# Patient Record
Sex: FEMALE | NOT HISPANIC OR LATINO
[De-identification: names, ages, dates, MRNs, and addresses within clinical notes are randomized per-mention and may not be internally consistent; named-entity substitution may affect disease eponyms.]

---

## 2017-08-02 ENCOUNTER — RX RENEWAL (OUTPATIENT)
Age: 57
End: 2017-08-02

## 2017-08-11 ENCOUNTER — LABORATORY RESULT (OUTPATIENT)
Age: 57
End: 2017-08-11

## 2017-09-19 ENCOUNTER — APPOINTMENT (OUTPATIENT)
Dept: NEPHROLOGY | Facility: CLINIC | Age: 57
End: 2017-09-19
Payer: MEDICAID

## 2017-09-19 VITALS
OXYGEN SATURATION: 98 % | HEART RATE: 75 BPM | SYSTOLIC BLOOD PRESSURE: 110 MMHG | BODY MASS INDEX: 23.51 KG/M2 | WEIGHT: 141.09 LBS | HEIGHT: 65 IN | DIASTOLIC BLOOD PRESSURE: 69 MMHG

## 2017-09-19 VITALS — SYSTOLIC BLOOD PRESSURE: 106 MMHG | DIASTOLIC BLOOD PRESSURE: 66 MMHG

## 2017-09-19 PROCEDURE — 99214 OFFICE O/P EST MOD 30 MIN: CPT

## 2017-09-20 ENCOUNTER — RX RENEWAL (OUTPATIENT)
Age: 57
End: 2017-09-20

## 2018-04-04 ENCOUNTER — EMERGENCY (EMERGENCY)
Facility: HOSPITAL | Age: 58
LOS: 1 days | Discharge: HOME | End: 2018-04-04
Attending: EMERGENCY MEDICINE
Payer: COMMERCIAL

## 2018-04-04 VITALS
DIASTOLIC BLOOD PRESSURE: 80 MMHG | RESPIRATION RATE: 16 BRPM | SYSTOLIC BLOOD PRESSURE: 125 MMHG | HEART RATE: 80 BPM | TEMPERATURE: 98 F | OXYGEN SATURATION: 97 %

## 2018-04-04 VITALS
DIASTOLIC BLOOD PRESSURE: 82 MMHG | SYSTOLIC BLOOD PRESSURE: 123 MMHG | RESPIRATION RATE: 18 BRPM | HEART RATE: 86 BPM | OXYGEN SATURATION: 98 % | TEMPERATURE: 98 F | WEIGHT: 139.99 LBS

## 2018-04-04 DIAGNOSIS — R09.89 OTHER SPECIFIED SYMPTOMS AND SIGNS INVOLVING THE CIRCULATORY AND RESPIRATORY SYSTEMS: ICD-10-CM

## 2018-04-04 PROCEDURE — 99284 EMERGENCY DEPT VISIT MOD MDM: CPT | Mod: 25

## 2018-04-04 PROCEDURE — 70490 CT SOFT TISSUE NECK W/O DYE: CPT

## 2018-04-04 PROCEDURE — 99285 EMERGENCY DEPT VISIT HI MDM: CPT | Mod: 25

## 2018-04-04 PROCEDURE — 70490 CT SOFT TISSUE NECK W/O DYE: CPT | Mod: 26

## 2018-04-04 PROCEDURE — 31575 DIAGNOSTIC LARYNGOSCOPY: CPT

## 2018-04-04 PROCEDURE — 99284 EMERGENCY DEPT VISIT MOD MDM: CPT

## 2018-04-04 NOTE — ED PROVIDER NOTE - PHYSICAL EXAMINATION
A&Ox3 no apparent distress. Oral and pharyngeal mucosa WNL. CN 2-12 grossly intact without focal neurologic defict. PERRLA, EOMI. Heart RRR no murmur. No JVD. No peripheral edema. Lungs CTA b/l no wheezes, rhonchi, rales. Abdomen soft nontender nondistended. Peripheral pulses present and equal b/l. Head NCAT. Skin normal for race.

## 2018-04-04 NOTE — CONSULT NOTE ADULT - PROBLEM SELECTOR RECOMMENDATION 9
Anti reflux meds  F/U with ENT if pt doesn't feel better  F/U official neck CT report Anti reflux meds  reflux precautions  can consider medrol dose pack for VF ecchymosis and dysphonia   F/U with ENT if pt doesn't feel better  F/U official neck CT report

## 2018-04-04 NOTE — ED PROVIDER NOTE - OBJECTIVE STATEMENT
57 yoF no significant medical history presents with foreign body sensation in throat after choking on shrimp earlier today. Pt endorses dysphagia with solids but not liquids. Pt denies fever, chills, N/V/D, abdominal pain, fall/trauma, HA, dizziness, LOC, SOB, dyspnea, CP.

## 2018-04-04 NOTE — CONSULT NOTE ADULT - ASSESSMENT
56yo Female with foreign body sensation since lunch after swallowing a baby shrimp. Indirect laryngoscopy revealed a left BOT hematoma, but no foreign body. 56yo Female with foreign body sensation since lunch after swallowing a baby shrimp. Indirect laryngoscopy revealed a left arytenoid hematoma, but no foreign body. no airway obstruction or impingement

## 2018-04-04 NOTE — ED PROVIDER NOTE - MEDICAL DECISION MAKING DETAILS
ATTG: left throat pain concern for FB, check CT, pain medication offered and refused,. may need ENT if FB present.

## 2018-04-04 NOTE — ED PROVIDER NOTE - PROGRESS NOTE DETAILS
ATTG: Dr Rey - ENT came to bedside and scoped patient and no FB noted. awaiting Ct results. ATTG: ct reading with no radioopaque FB noted. dc home to follow with pmd as outpt.

## 2018-04-04 NOTE — ED PROVIDER NOTE - ATTENDING CONTRIBUTION TO CARE
56 y/o f with pmhx HTN presents for pain in left side of throat after swallowing a shrimp approx 1 hr ago now feels like it is stuck in her throat.  no bleeding no shortness of breath. no pain in chest. first time. no vomiting. no allergy. son with her at bedside.   Gen.  no acute distress  HEENT:  EOMI PERRL, pharynx clear no blood. no exudate. patent airway, midline uvula. no edema no FB noted.   Lungs:  ctab/l no wheezing or retraction  CVS: S1S2    Abd;  soft non tend  Ext: no edema  Neuro: aaox3 no focal deficits.   MSK: 5/5 x 4 ext

## 2018-04-04 NOTE — CONSULT NOTE ADULT - SUBJECTIVE AND OBJECTIVE BOX
POD/STATUS POST/ENT ISSUE: Foreign body sensation    INTERVAL HPI: 58yo female with pmhx HTN presents to the ER C/O pain in the left side of throat since around 12 noon. Pt states it started while eating shrimp for lunch and fell that a baby shrimp is suck in her throat. Pt admits to drinking water and vinegar and fell a little better, but still has pain. Pt tried to cough it out with no success. Pt also cough up some blood tinged secretions in the ER. Pt speaks in full sentences. Pt admits to odynophagia, but denies SOB, change in voice, N/V, previous H/O foods getting stuck in her throat.      Vital Signs Last 24 Hrs  T(C): 36.7 (04 Apr 2018 14:05), Max: 36.7 (04 Apr 2018 14:05)  T(F): 98 (04 Apr 2018 14:05), Max: 98 (04 Apr 2018 14:05)  HR: 86 (04 Apr 2018 14:05) (86 - 86)  BP: 123/82 (04 Apr 2018 14:05) (123/82 - 123/82)  BP(mean): --  RR: 18 (04 Apr 2018 14:05) (18 - 18)  SpO2: 98% (04 Apr 2018 14:05) (98% - 98%)    PHYSICAL EXAM:  Gen: NAD, well-developed  Head: Normocephalic, Atraumatic  Eyes: PERRL, EOMI, no scleral injection  Nose: Nares bilaterally patent, no discharge  Mouth: no drooling, no FB noted, Mucosa moist, tongue/uvula midline, oropharynx clear  Neck: Flat, supple, no lymphadenopathy, trachea midline, no masses  Resp: no stridor  CV: no peripheral edema/cyanosis    FOE/Laryngoscopy- Hematoma noted in left BOT, ecchymosis of left true VC, no active bleeding, no FB noted, no pooling of secretions, airway patent, no edema of B/L vocal folds and post cricoid region. POD/STATUS POST/ENT ISSUE: Foreign body sensation    INTERVAL HPI: 56yo female with pmhx HTN presents to the ER C/O pain in the left side of throat since around 12 noon. Pt states it started while eating shrimp for lunch and fell that a baby shrimp is suck in her throat. Pt admits to drinking water and vinegar and fell a little better, but still has pain. Pt fell the FB sensation a little lower after that. Pt tried to cough it out with no success. Pt also cough up some blood tinged secretions in the ER. Pt speaks in full sentences. Pt admits to odynophagia, but denies SOB, change in voice, N/V, previous H/O foods getting stuck in her throat.      Vital Signs Last 24 Hrs  T(C): 36.7 (04 Apr 2018 14:05), Max: 36.7 (04 Apr 2018 14:05)  T(F): 98 (04 Apr 2018 14:05), Max: 98 (04 Apr 2018 14:05)  HR: 86 (04 Apr 2018 14:05) (86 - 86)  BP: 123/82 (04 Apr 2018 14:05) (123/82 - 123/82)  BP(mean): --  RR: 18 (04 Apr 2018 14:05) (18 - 18)  SpO2: 98% (04 Apr 2018 14:05) (98% - 98%)    PHYSICAL EXAM:  Gen: NAD, well-developed  Head: Normocephalic, Atraumatic  Eyes: PERRL, EOMI, no scleral injection  Nose: Nares bilaterally patent, no discharge  Mouth: no drooling, no FB noted, Mucosa moist, tongue/uvula midline, oropharynx clear  Neck: Flat, supple, no lymphadenopathy, trachea midline, no masses  Resp: no stridor  CV: no peripheral edema/cyanosis    FOE/Laryngoscopy- Hematoma noted in left BOT, ecchymosis of left true VC, no active bleeding, no FB noted, no pooling of secretions, airway patent, no edema of B/L vocal folds and post cricoid region. POD/STATUS POST/ENT ISSUE: Foreign body sensation    INTERVAL HPI: 58yo female with pmhx HTN presents to the ER C/O pain in the left side of throat since around 12 noon. Pt states it started while eating shrimp for lunch and fell that a baby shrimp is suck in her throat. Pt admits to drinking water and vinegar and fell a little better, but still has pain. Pt fell the FB sensation a little lower after that. Pt tried to cough it out with no success. Pt also cough up some blood tinged secretions in the ER. Pt speaks in full sentences. Pt admits to odynophagia, but denies SOB, change in voice, N/V, previous H/O foods getting stuck in her throat.      Vital Signs Last 24 Hrs  T(C): 36.7 (04 Apr 2018 14:05), Max: 36.7 (04 Apr 2018 14:05)  T(F): 98 (04 Apr 2018 14:05), Max: 98 (04 Apr 2018 14:05)  HR: 86 (04 Apr 2018 14:05) (86 - 86)  BP: 123/82 (04 Apr 2018 14:05) (123/82 - 123/82)  BP(mean): --  RR: 18 (04 Apr 2018 14:05) (18 - 18)  SpO2: 98% (04 Apr 2018 14:05) (98% - 98%)    PHYSICAL EXAM:  Gen: NAD, well-developed  Head: Normocephalic, Atraumatic  Eyes: PERRL, EOMI, no scleral injection  Nose: Nares bilaterally patent, no discharge  Mouth: no drooling, no FB noted, Mucosa moist, tongue/uvula midline, oropharynx clear  Neck: Flat, supple, no lymphadenopathy, trachea midline, no masses  Resp: no stridor  CV: no peripheral edema/cyanosis    FOE/Laryngoscopy- Hematoma noted in left posterio surface of the arytenoid, ecchymosis of left true VC, no active bleeding, no FB noted, no pooling of secretions, airway patent, no edema of B/L vocal folds and post cricoid region. mild fullness right BOT - no evidence acute trauma there POD/STATUS POST/ENT ISSUE: Foreign body sensation    INTERVAL HPI: 56yo female with pmhx HTN presents to the ER C/O pain in the left side of throat since around 12 noon. Pt states it started while eating shrimp for lunch and fell that a baby shrimp is suck in her throat. Pt admits to drinking water and vinegar and fell a little better, but still has pain. Pt fell the FB sensation a little lower after that. Pt tried to cough it out with no success. Pt also cough up some blood tinged secretions in the ER. Pt speaks in full sentences. Pt admits to odynophagia, but denies SOB, change in voice, N/V, previous H/O foods getting stuck in her throat.      Vital Signs Last 24 Hrs  T(C): 36.7 (04 Apr 2018 14:05), Max: 36.7 (04 Apr 2018 14:05)  T(F): 98 (04 Apr 2018 14:05), Max: 98 (04 Apr 2018 14:05)  HR: 86 (04 Apr 2018 14:05) (86 - 86)  BP: 123/82 (04 Apr 2018 14:05) (123/82 - 123/82)  BP(mean): --  RR: 18 (04 Apr 2018 14:05) (18 - 18)  SpO2: 98% (04 Apr 2018 14:05) (98% - 98%)    PHYSICAL EXAM:  Gen: NAD, well-developed  Head: Normocephalic, Atraumatic  Eyes: PERRL, EOMI, no scleral injection  Nose: Nares bilaterally patent, no discharge  Mouth: no drooling, no FB noted, Mucosa moist, tongue/uvula midline, oropharynx clear  Neck: Flat, supple, no lymphadenopathy, trachea midline, no masses  Resp: no stridor  CV: no peripheral edema/cyanosis    FOE/Laryngoscopy- Hematoma noted in left posterio surface of the arytenoid, ecchymosis of left true VC, no active bleeding, no FB noted, no pooling of secretions, airway patent, no edema of B/L vocal folds and post cricoid region. mild fullness right BOT - no evidence acute trauma there       Ct reviewed

## 2020-02-17 NOTE — ED ADULT NURSE NOTE - BREATHING, MLM
Detail Level: Simple Spontaneous, unlabored and symmetrical Scheduled For Follow Up In (Optional): 13 weeks

## 2020-03-10 ENCOUNTER — APPOINTMENT (OUTPATIENT)
Dept: NEPHROLOGY | Facility: CLINIC | Age: 60
End: 2020-03-10

## 2024-07-03 ENCOUNTER — EMERGENCY (EMERGENCY)
Facility: HOSPITAL | Age: 64
LOS: 1 days | Discharge: HOME | End: 2024-07-03
Attending: EMERGENCY MEDICINE
Payer: COMMERCIAL

## 2024-07-03 VITALS
TEMPERATURE: 98 F | OXYGEN SATURATION: 96 % | HEART RATE: 66 BPM | RESPIRATION RATE: 18 BRPM | SYSTOLIC BLOOD PRESSURE: 124 MMHG | DIASTOLIC BLOOD PRESSURE: 74 MMHG

## 2024-07-03 VITALS
HEART RATE: 72 BPM | TEMPERATURE: 98 F | SYSTOLIC BLOOD PRESSURE: 121 MMHG | DIASTOLIC BLOOD PRESSURE: 79 MMHG | RESPIRATION RATE: 16 BRPM | OXYGEN SATURATION: 97 %

## 2024-07-03 LAB
ALBUMIN SERPL ELPH-MCNC: 3.9 G/DL — SIGNIFICANT CHANGE UP (ref 3.3–5)
ALP SERPL-CCNC: 56 U/L — SIGNIFICANT CHANGE UP (ref 40–120)
ALT FLD-CCNC: 11 U/L — SIGNIFICANT CHANGE UP (ref 10–45)
ANION GAP SERPL CALC-SCNC: 13 MMOL/L — SIGNIFICANT CHANGE UP (ref 5–17)
ANION GAP SERPL CALC-SCNC: 14 MMOL/L — SIGNIFICANT CHANGE UP (ref 5–17)
AST SERPL-CCNC: 15 U/L — SIGNIFICANT CHANGE UP (ref 10–40)
BASOPHILS # BLD AUTO: 0.01 K/UL — SIGNIFICANT CHANGE UP (ref 0–0.2)
BASOPHILS NFR BLD AUTO: 0.2 % — SIGNIFICANT CHANGE UP (ref 0–2)
BILIRUB SERPL-MCNC: 0.4 MG/DL — SIGNIFICANT CHANGE UP (ref 0.2–1.2)
BUN SERPL-MCNC: 32 MG/DL — HIGH (ref 7–23)
BUN SERPL-MCNC: 32 MG/DL — HIGH (ref 7–23)
CALCIUM SERPL-MCNC: 9.2 MG/DL — SIGNIFICANT CHANGE UP (ref 8.4–10.5)
CALCIUM SERPL-MCNC: 9.5 MG/DL — SIGNIFICANT CHANGE UP (ref 8.4–10.5)
CHLORIDE SERPL-SCNC: 104 MMOL/L — SIGNIFICANT CHANGE UP (ref 96–108)
CHLORIDE SERPL-SCNC: 106 MMOL/L — SIGNIFICANT CHANGE UP (ref 96–108)
CO2 SERPL-SCNC: 22 MMOL/L — SIGNIFICANT CHANGE UP (ref 22–31)
CO2 SERPL-SCNC: 25 MMOL/L — SIGNIFICANT CHANGE UP (ref 22–31)
CREAT SERPL-MCNC: 1.43 MG/DL — HIGH (ref 0.5–1.3)
CREAT SERPL-MCNC: 1.53 MG/DL — HIGH (ref 0.5–1.3)
EGFR: 38 ML/MIN/1.73M2 — LOW
EGFR: 38 ML/MIN/1.73M2 — LOW
EGFR: 41 ML/MIN/1.73M2 — LOW
EGFR: 41 ML/MIN/1.73M2 — LOW
EOSINOPHIL # BLD AUTO: 0.05 K/UL — SIGNIFICANT CHANGE UP (ref 0–0.5)
EOSINOPHIL NFR BLD AUTO: 0.9 % — SIGNIFICANT CHANGE UP (ref 0–6)
GLUCOSE SERPL-MCNC: 102 MG/DL — HIGH (ref 70–99)
GLUCOSE SERPL-MCNC: 112 MG/DL — HIGH (ref 70–99)
HCT VFR BLD CALC: 39.8 % — SIGNIFICANT CHANGE UP (ref 34.5–45)
HGB BLD-MCNC: 13.1 G/DL — SIGNIFICANT CHANGE UP (ref 11.5–15.5)
IMM GRANULOCYTES NFR BLD AUTO: 0.2 % — SIGNIFICANT CHANGE UP (ref 0–0.9)
LYMPHOCYTES # BLD AUTO: 1.43 K/UL — SIGNIFICANT CHANGE UP (ref 1–3.3)
LYMPHOCYTES # BLD AUTO: 25.7 % — SIGNIFICANT CHANGE UP (ref 13–44)
MAGNESIUM SERPL-MCNC: 2.2 MG/DL — SIGNIFICANT CHANGE UP (ref 1.6–2.6)
MCHC RBC-ENTMCNC: 30.6 PG — SIGNIFICANT CHANGE UP (ref 27–34)
MCHC RBC-ENTMCNC: 32.9 GM/DL — SIGNIFICANT CHANGE UP (ref 32–36)
MCV RBC AUTO: 93 FL — SIGNIFICANT CHANGE UP (ref 80–100)
MONOCYTES # BLD AUTO: 0.39 K/UL — SIGNIFICANT CHANGE UP (ref 0–0.9)
MONOCYTES NFR BLD AUTO: 7 % — SIGNIFICANT CHANGE UP (ref 2–14)
NEUTROPHILS # BLD AUTO: 3.67 K/UL — SIGNIFICANT CHANGE UP (ref 1.8–7.4)
NEUTROPHILS NFR BLD AUTO: 66 % — SIGNIFICANT CHANGE UP (ref 43–77)
NRBC # BLD: 0 /100 WBCS — SIGNIFICANT CHANGE UP (ref 0–0)
NRBC BLD-RTO: 0 /100 WBCS — SIGNIFICANT CHANGE UP (ref 0–0)
PHOSPHATE SERPL-MCNC: 4.2 MG/DL — SIGNIFICANT CHANGE UP (ref 2.5–4.5)
PLATELET # BLD AUTO: 184 K/UL — SIGNIFICANT CHANGE UP (ref 150–400)
POTASSIUM SERPL-MCNC: 4.2 MMOL/L — SIGNIFICANT CHANGE UP (ref 3.5–5.3)
POTASSIUM SERPL-MCNC: 4.4 MMOL/L — SIGNIFICANT CHANGE UP (ref 3.5–5.3)
POTASSIUM SERPL-SCNC: 4.2 MMOL/L — SIGNIFICANT CHANGE UP (ref 3.5–5.3)
POTASSIUM SERPL-SCNC: 4.4 MMOL/L — SIGNIFICANT CHANGE UP (ref 3.5–5.3)
PROT SERPL-MCNC: 7.1 G/DL — SIGNIFICANT CHANGE UP (ref 6–8.3)
RBC # BLD: 4.28 M/UL — SIGNIFICANT CHANGE UP (ref 3.8–5.2)
RBC # FLD: 13.6 % — SIGNIFICANT CHANGE UP (ref 10.3–14.5)
SODIUM SERPL-SCNC: 142 MMOL/L — SIGNIFICANT CHANGE UP (ref 135–145)
SODIUM SERPL-SCNC: 142 MMOL/L — SIGNIFICANT CHANGE UP (ref 135–145)
WBC # BLD: 5.56 K/UL — SIGNIFICANT CHANGE UP (ref 3.8–10.5)
WBC # FLD AUTO: 5.56 K/UL — SIGNIFICANT CHANGE UP (ref 3.8–10.5)

## 2024-07-03 PROCEDURE — 80048 BASIC METABOLIC PNL TOTAL CA: CPT

## 2024-07-03 PROCEDURE — 99284 EMERGENCY DEPT VISIT MOD MDM: CPT

## 2024-07-03 PROCEDURE — 96360 HYDRATION IV INFUSION INIT: CPT

## 2024-07-03 PROCEDURE — 80053 COMPREHEN METABOLIC PANEL: CPT

## 2024-07-03 PROCEDURE — 85025 COMPLETE CBC W/AUTO DIFF WBC: CPT

## 2024-07-03 PROCEDURE — 84100 ASSAY OF PHOSPHORUS: CPT

## 2024-07-03 PROCEDURE — 99284 EMERGENCY DEPT VISIT MOD MDM: CPT | Mod: 25

## 2024-07-03 PROCEDURE — 83735 ASSAY OF MAGNESIUM: CPT

## 2024-07-03 PROCEDURE — 36415 COLL VENOUS BLD VENIPUNCTURE: CPT

## 2024-07-03 RX ORDER — SODIUM CHLORIDE 9 G/1000ML
1000 INJECTION, SOLUTION INTRAVENOUS ONCE
Refills: 0 | Status: COMPLETED | OUTPATIENT
Start: 2024-07-03 | End: 2024-07-03

## 2024-07-03 RX ADMIN — SODIUM CHLORIDE 1000 MILLILITER(S): 9 INJECTION, SOLUTION INTRAVENOUS at 20:50

## 2024-07-03 RX ADMIN — SODIUM CHLORIDE 1000 MILLILITER(S): 9 INJECTION, SOLUTION INTRAVENOUS at 21:51

## 2024-07-15 ENCOUNTER — APPOINTMENT (OUTPATIENT)
Dept: CARDIOLOGY | Facility: CLINIC | Age: 64
End: 2024-07-15
Payer: MEDICARE

## 2024-07-15 VITALS
DIASTOLIC BLOOD PRESSURE: 75 MMHG | BODY MASS INDEX: 22.88 KG/M2 | OXYGEN SATURATION: 97 % | SYSTOLIC BLOOD PRESSURE: 113 MMHG | RESPIRATION RATE: 18 BRPM | HEIGHT: 64 IN | WEIGHT: 134 LBS | HEART RATE: 73 BPM

## 2024-07-15 DIAGNOSIS — E78.5 HYPERLIPIDEMIA, UNSPECIFIED: ICD-10-CM

## 2024-07-15 DIAGNOSIS — R06.02 SHORTNESS OF BREATH: ICD-10-CM

## 2024-07-15 PROCEDURE — 93015 CV STRESS TEST SUPVJ I&R: CPT

## 2024-07-15 PROCEDURE — 93306 TTE W/DOPPLER COMPLETE: CPT

## 2024-07-15 PROCEDURE — 99204 OFFICE O/P NEW MOD 45 MIN: CPT | Mod: 25
